# Patient Record
Sex: FEMALE | Race: WHITE | NOT HISPANIC OR LATINO | Employment: FULL TIME | URBAN - METROPOLITAN AREA
[De-identification: names, ages, dates, MRNs, and addresses within clinical notes are randomized per-mention and may not be internally consistent; named-entity substitution may affect disease eponyms.]

---

## 2017-06-02 ENCOUNTER — ALLSCRIPTS OFFICE VISIT (OUTPATIENT)
Dept: OTHER | Facility: OTHER | Age: 42
End: 2017-06-02

## 2017-06-02 DIAGNOSIS — Z12.31 ENCOUNTER FOR SCREENING MAMMOGRAM FOR MALIGNANT NEOPLASM OF BREAST: ICD-10-CM

## 2017-06-06 LAB
HPV 18 (HISTORICAL): NOT DETECTED
HPV HIGH RISK 16/18 (HISTORICAL): NOT DETECTED
HPV16 (HISTORICAL): NOT DETECTED
PAP (HISTORICAL): NORMAL

## 2018-01-14 VITALS
WEIGHT: 179 LBS | HEIGHT: 62 IN | SYSTOLIC BLOOD PRESSURE: 148 MMHG | BODY MASS INDEX: 32.94 KG/M2 | DIASTOLIC BLOOD PRESSURE: 88 MMHG

## 2021-03-03 ENCOUNTER — OFFICE VISIT (OUTPATIENT)
Dept: OBGYN CLINIC | Facility: CLINIC | Age: 46
End: 2021-03-03
Payer: COMMERCIAL

## 2021-03-03 VITALS
SYSTOLIC BLOOD PRESSURE: 142 MMHG | HEIGHT: 62 IN | WEIGHT: 185 LBS | BODY MASS INDEX: 34.04 KG/M2 | DIASTOLIC BLOOD PRESSURE: 92 MMHG

## 2021-03-03 DIAGNOSIS — Z12.31 ENCOUNTER FOR SCREENING MAMMOGRAM FOR MALIGNANT NEOPLASM OF BREAST: ICD-10-CM

## 2021-03-03 DIAGNOSIS — Z01.419 GYNECOLOGIC EXAM NORMAL: Primary | ICD-10-CM

## 2021-03-03 PROCEDURE — G0145 SCR C/V CYTO,THINLAYER,RESCR: HCPCS | Performed by: PHYSICIAN ASSISTANT

## 2021-03-03 PROCEDURE — 99396 PREV VISIT EST AGE 40-64: CPT | Performed by: PHYSICIAN ASSISTANT

## 2021-03-03 PROCEDURE — 87624 HPV HI-RISK TYP POOLED RSLT: CPT | Performed by: PHYSICIAN ASSISTANT

## 2021-03-03 RX ORDER — DIPHENOXYLATE HYDROCHLORIDE AND ATROPINE SULFATE 2.5; .025 MG/1; MG/1
1 TABLET ORAL DAILY
COMMUNITY

## 2021-03-03 RX ORDER — LABETALOL 200 MG/1
TABLET, FILM COATED ORAL
COMMUNITY
Start: 2021-02-23

## 2021-03-03 NOTE — ASSESSMENT & PLAN NOTE
Pap guidelines reviewed  Pap with HPV done today  Script for mammogram given  Return to office for annual or as needed

## 2021-03-03 NOTE — PROGRESS NOTES
Assessment/Plan   Problem List Items Addressed This Visit        Other    Gynecologic exam normal - Primary     Pap guidelines reviewed  Pap with HPV done today  Script for mammogram given  Return to office for annual or as needed  Relevant Orders    Liquid-based pap, screening      Other Visit Diagnoses     Encounter for screening mammogram for malignant neoplasm of breast        Relevant Orders    Mammo screening bilateral w 3d & cad          Subjective:     Patient ID: Jett Hurt is a 39 y o  y o  female  HPI  38 yo seen for annual exam  Menses regular with no issues  Denies bowel or bladder issues  Using condoms for birth control, declines another method at this time  Last pap: 2017 NILM (-)HRHPV  Last mammogram: 2021 stable bilateral breasts, 1 year diagnostic mammogram recommended  The following portions of the patient's history were reviewed and updated as appropriate:   She  has a past medical history of High blood pressure and Miscarriage  She   Patient Active Problem List    Diagnosis Date Noted    Gynecologic exam normal 2021    Incomplete  2015    Inevitable  2015    Benign essential hypertension 2014     She  has a past surgical history that includes Cervical biopsy w/ loop electrode excision; Cyst Removal; and Dilation and evacuation  Her family history includes Heart disease in her father; Hyperlipidemia in her mother  She  reports that she has quit smoking  She has never used smokeless tobacco  She reports current alcohol use  She reports that she does not use drugs    Current Outpatient Medications   Medication Sig Dispense Refill    labetalol (NORMODYNE) 200 mg tablet TAKE 1 TABLET BY MOUTH  TWICE DAILY      Multiple Vitamins-Minerals (ZINC PO) Take by mouth      multivitamin (THERAGRAN) TABS Take 1 tablet by mouth daily      VITAMIN D PO Take by mouth       No current facility-administered medications for this visit  She is allergic to pollen extract       Menstrual History:  OB History        4    Para   1    Term   1            AB   3    Living   1       SAB   3    TAB        Ectopic        Multiple        Live Births   1               Patient's last menstrual period was 2021  Review of Systems   Constitutional: Negative for fatigue, fever and unexpected weight change  HENT: Negative for dental problem and sinus pressure  Eyes: Negative for visual disturbance  Respiratory: Negative for cough, shortness of breath and wheezing  Cardiovascular: Negative for chest pain  Gastrointestinal: Negative for abdominal pain, blood in stool, constipation, diarrhea, nausea and vomiting  Endocrine: Negative for polydipsia  Genitourinary: Negative for difficulty urinating, dyspareunia, dysuria, frequency, hematuria, pelvic pain and urgency  Musculoskeletal: Negative for arthralgias and back pain  Neurological: Negative for dizziness, seizures, light-headedness and headaches  Psychiatric/Behavioral: Negative for suicidal ideas  The patient is not nervous/anxious  Objective:  Vitals:    21 0857   BP: 142/92   BP Location: Left arm   Patient Position: Sitting   Cuff Size: Large   Weight: 83 9 kg (185 lb)   Height: 5' 2" (1 575 m)      Physical Exam  Constitutional:       Appearance: Normal appearance  She is well-developed  Genitourinary:      Pelvic exam was performed with patient supine  Vulva, urethra, bladder, vagina, uterus and rectum normal       No vulval condylomata, lesion, tenderness, ulcerations, Bartholin's cyst or rash noted  No signs of labial injury  No labial fusion  No inguinal adenopathy present in the right or left side  No urethral prolapse, pain, swelling, tenderness, caruncle, mass or diverticulum present  Bladder is not distended or tender  No signs of injury or lesions in the vagina        No vaginal discharge, erythema, tenderness or bleeding  No cervical motion tenderness, discharge or lesion  Uterus is not enlarged, tender, irregular or mobile  No uterine mass detected  No right or left adnexal mass present  Right adnexa not tender or full  Left adnexa not tender or full  Rectum:      Guaiac result negative  No rectal mass, anal fissure, tenderness, external hemorrhoid, internal hemorrhoid or abnormal anal tone  HENT:      Head: Normocephalic and atraumatic  Neck:      Thyroid: No thyromegaly  Cardiovascular:      Rate and Rhythm: Normal rate and regular rhythm  Heart sounds: Normal heart sounds  No murmur  No friction rub  No gallop  Pulmonary:      Effort: Pulmonary effort is normal  No respiratory distress  Breath sounds: Normal breath sounds  No wheezing  Chest:      Breasts: Breasts are symmetrical          Right: Normal  No swelling, bleeding, inverted nipple, mass, nipple discharge, skin change or tenderness  Left: Normal  No swelling, bleeding, inverted nipple, mass, nipple discharge, skin change or tenderness  Abdominal:      General: There is no distension  Palpations: Abdomen is soft  There is no mass  Tenderness: There is no abdominal tenderness  There is no guarding or rebound  Hernia: No hernia is present  Lymphadenopathy:      Cervical: No cervical adenopathy  Upper Body:      Right upper body: No supraclavicular, axillary or pectoral adenopathy  Left upper body: No supraclavicular, axillary or pectoral adenopathy  Lower Body: No right inguinal adenopathy  No left inguinal adenopathy  Neurological:      Mental Status: She is alert and oriented to person, place, and time  Skin:     General: Skin is warm and dry     Psychiatric:         Behavior: Behavior normal

## 2021-03-05 LAB
HPV HR 12 DNA CVX QL NAA+PROBE: NEGATIVE
HPV16 DNA CVX QL NAA+PROBE: NEGATIVE
HPV18 DNA CVX QL NAA+PROBE: NEGATIVE

## 2021-03-10 LAB
LAB AP GYN PRIMARY INTERPRETATION: NORMAL
LAB AP LMP: NORMAL
Lab: NORMAL
PATH INTERP SPEC-IMP: NORMAL

## 2021-04-08 DIAGNOSIS — Z23 ENCOUNTER FOR IMMUNIZATION: ICD-10-CM

## 2022-10-12 PROBLEM — Z01.419 GYNECOLOGIC EXAM NORMAL: Status: RESOLVED | Noted: 2021-03-03 | Resolved: 2022-10-12

## 2024-03-28 NOTE — PROGRESS NOTES
Assessment/Plan   Diagnoses and all orders for this visit:    Encounter for gynecological examination (general) (routine) without abnormal findings  The current [facility] guidelines were reviewed. Patient's last pap was 3/3/21 - WNL (-) HRHPV type 16/18 neg and therefore, a pap with HPV cotesting is not indicated at this time. I emphasized the importance of an annual pelvic and breast exam. Patient ok to defer pap today.    Encounter for screening mammogram for malignant neoplasm of breast  -     Mammo screening bilateral w 3d & cad; Future  A yearly mammogram is recommended for breast cancer screening starting at age 40;    Discussion  I have discussed the importance of monthly self-breast exams, exercise and healthy diet as well as adequate intake of calcium and vitamin D. Encourage MVI q day and r/sabrina importance of folic acid; Encourage 30-40 min weight bearing exercise most days of week  Encourage safe sexual practices; STI testing - declines  Contraception - condoms working well  In addition, colon cancer screening with a colonoscopy is recommended starting at age 45-50 and reviewed benefits - has Cologuard kit at home through PCP  All questions have been answered to her satisfaction  RTO for APE or sooner if needed    Subjective     HPI   Shirley Cho is a 48 y.o. female who presents for annual well woman exam as an old/np. Son plays baseball (second/short) and basketball.  Menarche - 14; LMP - 3/12/24; Periods are irreg (occasionally may skip a month in the last 1.5 years) but for the most part q month and last 4-6 days; No excessive bleeding; No intermenstrual bleeding or spotting; Cramps are tolerable.  No vulvar itch/burn; No vaginal itch/burn; No abn discharge or odor; No urinary sx - burning/pain/frequency/hematuria  (+) SBEs - no breast masses, asymmetry, nipple discharge or bleeding, changes in skin of breast, or breast tenderness bilaterally  No abd/pelvic pain or HAs;   (+) menopausal symptoms:  (+) hot flashes/night sweats - mostly occur around ovulation or right before the period; tolerable at this time; no problems with intercourse, vaginal dryness; sleeping ok - noticing a little more difficulty;   Pt is sexually active in a mutually monog/ sexual relationship; She declines sti/hiv/hep testing; Feels safe at home  Current contraception: condoms working well  (+) PCP for routine Bw/care;    Last Pap - 3/3/21 - WNL (-) HRHPV type 16/18 neg  History of abnormal Pap smear: yes s/p LEEP  Last mammo - 10/9/23 - B1neg/TypeB  History of abnormal mammogram: yes  Last colonoscopy - none - has Cologuard kit at home for PCP    Review of Systems   Constitutional:  Negative for activity change, fatigue, fever and unexpected weight change.   HENT:  Negative for congestion, dental problem, sinus pressure and sinus pain.    Eyes:  Negative for visual disturbance.   Respiratory:  Negative for cough, shortness of breath and wheezing.    Cardiovascular:  Negative for chest pain and leg swelling.   Gastrointestinal:  Negative for abdominal distention, abdominal pain, blood in stool, constipation, diarrhea, nausea and vomiting.   Endocrine: Negative for polydipsia.   Genitourinary:  Negative for difficulty urinating, dyspareunia, dysuria, frequency, hematuria, menstrual problem, pelvic pain, urgency, vaginal bleeding, vaginal discharge and vaginal pain.   Musculoskeletal:  Negative for arthralgias and back pain.   Allergic/Immunologic: Negative for environmental allergies.   Neurological:  Negative for dizziness, seizures and headaches.   Psychiatric/Behavioral:  Negative for dysphoric mood and sleep disturbance. The patient is not nervous/anxious.        The following portions of the patient's history were reviewed and updated as appropriate: allergies, current medications, past family history, past medical history, past social history, past surgical history, and problem list.         OB History          4     Para   1    Term   1            AB   3    Living   1         SAB   3    IAB        Ectopic        Multiple        Live Births   1           Obstetric Comments   : 3 SABs and 1                Past Medical History:   Diagnosis Date    Abnormal Pap smear of cervix 2009    High blood pressure     Hypertension     Miscarriage        Past Surgical History:   Procedure Laterality Date    CERVICAL BIOPSY  W/ LOOP ELECTRODE EXCISION      CYST REMOVAL      DILATION AND EVACUATION      NV CONIZATION CERVIX W/WO D&C RPR KNIFE/LASER  2010       Family History   Problem Relation Age of Onset    Hyperlipidemia Mother     Heart disease Father        Social History     Socioeconomic History    Marital status: /Civil Union     Spouse name: Not on file    Number of children: Not on file    Years of education: Not on file    Highest education level: Not on file   Occupational History    Not on file   Tobacco Use    Smoking status: Former     Current packs/day: 0.50     Types: Cigarettes    Smokeless tobacco: Never    Tobacco comments:     Quit 7 years ago   Vaping Use    Vaping status: Never Used   Substance and Sexual Activity    Alcohol use: Yes     Alcohol/week: 1.0 standard drink of alcohol     Types: 1 Glasses of wine per week     Comment: occasional    Drug use: Never    Sexual activity: Yes     Partners: Male     Birth control/protection: Condom Male   Other Topics Concern    Not on file   Social History Narrative    Not on file     Social Determinants of Health     Financial Resource Strain: Not on file   Food Insecurity: Not on file   Transportation Needs: Not on file   Physical Activity: Not on file   Stress: Not on file   Social Connections: Not on file   Intimate Partner Violence: Not on file   Housing Stability: Not on file         Current Outpatient Medications:     amLODIPine (NORVASC) 5 mg tablet, Take 5 mg by mouth daily, Disp: , Rfl:     labetalol (NORMODYNE) 200 mg tablet, TAKE 1 TABLET BY MOUTH   "TWICE DAILY, Disp: , Rfl:     Multiple Vitamins-Minerals (ZINC PO), Take by mouth, Disp: , Rfl:     multivitamin (THERAGRAN) TABS, Take 1 tablet by mouth daily, Disp: , Rfl:     VITAMIN D PO, Take by mouth, Disp: , Rfl:     Allergies   Allergen Reactions    Pollen Extract Itching       Objective   Vitals:    04/02/24 0909   BP: 120/78   BP Location: Left arm   Patient Position: Sitting   Cuff Size: Standard   Weight: 78.9 kg (174 lb)   Height: 5' 2\" (1.575 m)     Physical Exam  Vitals reviewed.   Constitutional:       General: She is awake. She is not in acute distress.     Appearance: Normal appearance. She is well-developed and well-groomed. She is not ill-appearing, toxic-appearing or diaphoretic.   HENT:      Head: Normocephalic and atraumatic.   Eyes:      Conjunctiva/sclera: Conjunctivae normal.   Neck:      Thyroid: No thyroid mass, thyromegaly or thyroid tenderness.   Cardiovascular:      Rate and Rhythm: Normal rate and regular rhythm.      Heart sounds: Normal heart sounds. No murmur heard.  Pulmonary:      Effort: Pulmonary effort is normal. No tachypnea, bradypnea or respiratory distress.      Breath sounds: Normal breath sounds. No stridor or decreased air movement. No wheezing.   Chest:   Breasts:     Breasts are symmetrical.      Right: Normal. No swelling, bleeding, inverted nipple, mass, nipple discharge, skin change or tenderness.      Left: Normal. No swelling, bleeding, inverted nipple, mass, nipple discharge, skin change or tenderness.   Abdominal:      General: There is no distension.      Palpations: Abdomen is soft. There is no hepatomegaly, splenomegaly or mass.      Tenderness: There is no abdominal tenderness.      Hernia: No hernia is present. There is no hernia in the left inguinal area or right inguinal area.   Genitourinary:     General: Normal vulva.      Exam position: Supine.      Pubic Area: No rash or pubic lice.       Labia:         Right: No rash, tenderness, lesion or injury.   "       Left: No rash, tenderness, lesion or injury.       Urethra: No prolapse, urethral pain, urethral swelling or urethral lesion.      Vagina: Normal. No signs of injury and foreign body. No vaginal discharge, erythema, tenderness, bleeding, lesions or prolapsed vaginal walls.      Cervix: No cervical motion tenderness, discharge, friability, lesion, erythema or cervical bleeding.      Uterus: Not deviated, not enlarged, not fixed, not tender and no uterine prolapse.       Adnexa:         Right: No mass, tenderness or fullness.          Left: No mass, tenderness or fullness.        Rectum: Normal. Guaiac result negative. No mass, tenderness, anal fissure, external hemorrhoid or internal hemorrhoid. Normal anal tone.   Lymphadenopathy:      Cervical: No cervical adenopathy.      Upper Body:      Right upper body: No supraclavicular or axillary adenopathy.      Left upper body: No supraclavicular or axillary adenopathy.      Lower Body: No right inguinal adenopathy. No left inguinal adenopathy.   Skin:     General: Skin is warm and dry.   Neurological:      Mental Status: She is alert and oriented to person, place, and time.   Psychiatric:         Mood and Affect: Mood and affect normal.         Speech: Speech normal.         Behavior: Behavior normal. Behavior is cooperative.         Thought Content: Thought content normal.         Judgment: Judgment normal.

## 2024-04-02 ENCOUNTER — OFFICE VISIT (OUTPATIENT)
Dept: OBGYN CLINIC | Facility: CLINIC | Age: 49
End: 2024-04-02
Payer: COMMERCIAL

## 2024-04-02 VITALS
DIASTOLIC BLOOD PRESSURE: 78 MMHG | SYSTOLIC BLOOD PRESSURE: 120 MMHG | HEIGHT: 62 IN | WEIGHT: 174 LBS | BODY MASS INDEX: 32.02 KG/M2

## 2024-04-02 DIAGNOSIS — Z12.31 ENCOUNTER FOR SCREENING MAMMOGRAM FOR MALIGNANT NEOPLASM OF BREAST: ICD-10-CM

## 2024-04-02 DIAGNOSIS — Z01.419 ENCOUNTER FOR GYNECOLOGICAL EXAMINATION (GENERAL) (ROUTINE) WITHOUT ABNORMAL FINDINGS: Primary | ICD-10-CM

## 2024-04-02 PROCEDURE — 99386 PREV VISIT NEW AGE 40-64: CPT | Performed by: PHYSICIAN ASSISTANT

## 2024-04-02 RX ORDER — AMLODIPINE BESYLATE 5 MG/1
5 TABLET ORAL DAILY
COMMUNITY
Start: 2024-01-24